# Patient Record
Sex: MALE | Race: ASIAN | NOT HISPANIC OR LATINO | Employment: STUDENT | URBAN - NONMETROPOLITAN AREA
[De-identification: names, ages, dates, MRNs, and addresses within clinical notes are randomized per-mention and may not be internally consistent; named-entity substitution may affect disease eponyms.]

---

## 2020-12-02 ENCOUNTER — OFFICE VISIT (OUTPATIENT)
Dept: FAMILY MEDICINE CLINIC | Facility: CLINIC | Age: 25
End: 2020-12-02

## 2020-12-02 VITALS
DIASTOLIC BLOOD PRESSURE: 72 MMHG | BODY MASS INDEX: 29.49 KG/M2 | HEART RATE: 67 BPM | SYSTOLIC BLOOD PRESSURE: 123 MMHG | TEMPERATURE: 98.4 F | WEIGHT: 177 LBS | HEIGHT: 65 IN | OXYGEN SATURATION: 98 %

## 2020-12-02 DIAGNOSIS — G47.00 INSOMNIA, UNSPECIFIED TYPE: ICD-10-CM

## 2020-12-02 DIAGNOSIS — J20.9 ACUTE BRONCHITIS, UNSPECIFIED ORGANISM: Primary | ICD-10-CM

## 2020-12-02 PROCEDURE — 99203 OFFICE O/P NEW LOW 30 MIN: CPT | Performed by: FAMILY MEDICINE

## 2020-12-02 RX ORDER — AZITHROMYCIN 250 MG/1
TABLET, FILM COATED ORAL
Qty: 6 TABLET | Refills: 0 | Status: SHIPPED | OUTPATIENT
Start: 2020-12-02 | End: 2020-12-07

## 2020-12-02 RX ORDER — GUAIFENESIN AND DEXTROMETHORPHAN HYDROBROMIDE 600; 30 MG/1; MG/1
2 TABLET, EXTENDED RELEASE ORAL EVERY 12 HOURS SCHEDULED
Qty: 40 TABLET | Refills: 0 | Status: SHIPPED | OUTPATIENT
Start: 2020-12-02 | End: 2020-12-12

## 2020-12-02 RX ORDER — HYDROXYZINE HYDROCHLORIDE 25 MG/1
50 TABLET, FILM COATED ORAL NIGHTLY
Qty: 14 TABLET | Refills: 0 | Status: SHIPPED | OUTPATIENT
Start: 2020-12-02 | End: 2020-12-16

## 2020-12-02 NOTE — PATIENT INSTRUCTIONS
Viêm ph? qu?n c?p tính, ng??i l?n  Acute Bronchitis, Adult    Viêm ph? qu?n c?p tính là tình tr?ng s?ng ??t ng?t ho?c c?p tính các ?ng d?n khí (ph? qu?n) danni ph?i. Viêm ph? qu?n c?p tính làm cho các ?ng này ??y d?ch nh?y, ?i?u này có th? d?n ??n khó th?. B?nh c?ng có th? gây ho ho?c th? khò khè.  ? ng??i l?n, viêm ph? qu?n c?p tính th??ng kh?i danni vòng 2 tu?n. Ho do viêm ph? qu?n có th? kéo dài ??n 3 tu?n. Hút thu?c, d? ?ng và hen alex?n có th? làm cho tình tr?ng này tr?m tr?ng h?n.  Nguyên nhân gì gây ra?  Tình tr?ng này có th? do các m?m b?nh và các ch?t gây kích thích ph?i gây ra, oh g?m:  · Vi rút gây c?m l?nh và cúm. Nguyên nhân th??ng g?p nh?t c?a tình tr?ng này là vi rút gây c?m l?nh thông th??ng.  · Vi khu?n.  · Các ch?t gây kích ?ng ph?i, oh g?m:  ? Khói t? thu?c lá ?i?u và các d?ng thu?c lá khác.  ? B?i và ph?n riley.  ? Khói t? các s?n ph?m hóa ch?t, khí, ho?c nhiên li?u ?ã cháy.  ? Các ch?t khác gây ô yolanda?m không khí danni nhà ho?c ngoài tr?i.  · Ti?p xúc g?n g?i v?i ai ?ó b? viêm ph? qu?n c?p tính.  ?i?u gì làm t?ng nguy c??  Nh?ng y?u t? claribel có th? khi?n cho quý v? d? b? tình tr?ng này h?n:  · H? th?ng b?o v? c? thê?, hay còn g?i là h? mi?n d?ch y?u.  · M?t tình tr?ng ?nh h??ng ??n ph?i và hô h?p, ch?ng h?n nh? hen alex?n.  Có các d?u hi?u ho?c tri?u ch?ng gì?  Nh?ng tri?u ch?ng th??ng g?p c?a tình tr?ng này oh g?m:  · Các v?n ?? ? ph?i và hô h?p, ch?ng h?n nh?:  ? Ho. Ho có th? hayde d?ch nh?y danni, màu vàng ho?c xanh ra kh?i ph?i c?a quý v? (??m).  ? Th? khò khè.  ? Có quá yolanda?u d?ch nh?y danni ph?i (t?c ngh?n danni ng?c).  ? B? th? h?n h?n.  · S?t.  · ?n l?nh.  · Các c?n nh?c và ?au, oh g?m:  ? T?c ng?c và ?au nh?c ch? khác trên c? th?.  ? ?au h?ng.  Ch?n ?oán tình tr?ng này nh? th? nào?  Tình tr?ng này th??ng ???c ch?n ?oán d?a vào:  · Tri?u ch?ng và b?nh s? c?a quý v?.  · Khám th?c th?.  Quý v? c?ng có th? ???c làm các ki?m tra khác, oh g?m c? các xét татьяна?m ?? lo?i tr? nh?boo atkins tr?boo cee,  nh? viêm ph?i. Nh??ng ki?m tra này oh g?m:  · Ki?m tra ch?c n?ng ph?i.  · Xét татьяна?m m?t m?u d?ch nh?y ?? tìm ki?m s? hi?n di?n c?a vi khu?n.  · Ki?m tra ?? xem n?ng ?? ô xi danni máu.  · Xét татьяна?m máu.  · Ch?p x-amilcar ng?c.  Tình tr?ng này ???c ?i?u tr? nh? th? nào?  H?u h?t các tr??ng h?p viêm ph? qu?n c?p tính ??u kh?i daylin th?i ke mà không c?n ?i?u tr?. Chuyên yin ch?m sóc s?c kh?e c?a quý v? có th? khuy?n ngh?:  · U?ng yolanda?u n??c h?n. Vi?c này có th? làm loãng d?ch nh?y, có th? c?i thi?n hô h?p.  · U?ng thu?c gi?m s?t ho?c gi?m ho.  · S? d?ng m?t d?ng c? ??a thu?c vào ph?i c?a quý v? (?ng thu?c hít) ?? giúp c?i thi?n hô h?p và kh? n?ng ki?m soát ho.  · Dùng m?t bình phun h?i ho?c máy t?o ?? ?m. Nh?ng máy này b? sung n??c vào không khí ?? giúp quý v? d? th? h?n.  Tuân th? nh?ng h??ng d?n này ? nhà:  Ho?t ??ng  · Ngh? ng?i th?t yolanda?u.  · Tr? l?i sinh ho?t bình th??ng daylin ch? d?n c?a chuyên yin ch?m sóc s?c kh?e. Hãy h?i chuyên yin ch?m sóc s?c kh?e v? các ho?t ??ng nào là an toàn cho quý v?.  L?i s?ng  · U?ng ?? n??c ?? gi? cho n??c ti?u có màu vàng nh?t.  · Không u?ng r??u.  · Không s? d?ng b?t k? s?n ph?m nào có nicotine ho?c thu?c lá, ch?ng ha?n nh? thu?c lá d?ng hút, thu?c lá ?i?n t? và thu?c lá d?ng nhai. N?u quý v? c?n giúp ?? ?? aarti thu?c, hãy h?i chuyên yin ch?m sóc s?c kh?e. L?u ý r?ng:  ? B?nh viêm ph? qu?n c?a quý v? s? tr?m tr?ng h?n n?u quý v? hút thu?c ho?c hít khói thu?c c?a ng??i khác (khói thu?c th? ??ng).  ? Ph?i quý v? s? bình ph?c nhanh h?n n?u quý v? b? hút thu?c.  H??ng d?n underwood    · Ch? s? d?ng thu?c không kê ??n và thu?c kê ??n daylin ch? d?n c?a chuyên yin ch?m sóc s?c kh?e.  · S? d?ng ?ng hít, bình phun h?i n??c mát ho?c máy ?i?u ?m daylin ch? d?n c?a chuyên yin ch?m sóc s?c kh?e.  · N?u quý v? b? ?au h?ng, hãy súc mi?ng b?ng h?n h?p n??c mu?i 3-4 l?n m?i ngày ho?c khi c?n. ?? làm m?t h?n h?p n??c mu?i, hãy hòa tan hoàn toàn ½-1 thìa cà phê (3-6 g) mu?josie razo 1 c?c (237 mL) n??c  ?m.  · Tuân th? t?t c? các l?n khám daylin dõi daylin ch? d?n c?a chuyên yin ch?m sóc s?c kh?e. ?i?u này có vai trò carmelo tr?ng.  Ng?n ng?a tình tr?ng này nh? th? nào?  ?? gi?m nguy c? m?c l?i tình tr?ng này:  · R?a ewelina th???ng xuyên b?ng xà phòng và n??c. N?u không có xà phòng và n??c, hãy dùng ch?t sát trùng ewelina.  · Tránh ti?p xúc v?i nh?ng ng??i có tri?u ch?ng c?m l?nh.  · C? g?ng không dùng ewelina ch?m ewelina lên mi?ng, m?i, ho?c m?t.  · Tránh nh?ng n?i có khói t? các hóa ch?t. Hít khói này s? khi?n tình tr?ng c?a quý v? tr?m tr?ng h?n.  · Tiêm phòng cúm hàng n?m.  Hãy liên l?c v?i chuyên yin ch?m sóc s?c kh?e n?u:  · Các tri?u ch?ng c?a quý v? không gi?m b?t clairbel 2 tu?n ?i?u tr?.  · Quý v? nôn yolanda?u h?n m?t ho?c sumaya l?n.  · Quý v? có các tri?u ch?ng m?t n??c, ch?ng h?n nh?:  ? N??c ti?u ??m màu.  ? Da ho?c m?t khô.  ? Khát t?ng lên.  ? ?au ??u.  ? Lú l?n.  ? Co th?t c?.  Yêu c?u tr? giúp ngay l?p t?c n?u quý v?:  · Ho ra máu.  · C?m th?y ?au ? ng?c.  · B? khó th? r?t yolanda?u.  · Ng?t x?u ho?c quý v? c?m th?y nh? là s?p ng?t x?u.  · B? ?au ??u d? d?i.  · B? s?t ho?c ?n l?nh tr?m tr?ng h?n.  Nh?ng tri?u ch?ng này có th? là bi?u hi?n c?a m?t v?n ?? nghiêm tr?ng c?n c?p c?u. Không ch? xem tri?u ch?ng có h?t không. Hãy ?i khám ngay l?p t?c. G?i cho d?ch v? c?p c?u t?i ??a ph??ng (911 ? Radha K?). Không t? lái xe ??n b?nh vi?n.  Tóm t?t  · Viêm ph? qu?n c?p tính là tình tr?ng viêm ??t ng?t (c?p tính) ? các ?ng d?n khí (ph? qu?n) n?m gi?a khí qu?n và ph?i. ? ng??i l?n, viêm ph? qu?n c?p tính th??ng kh?i danni vòng 2 tu?n, m?c dù ho có th? kéo dài 3 tu?n ho?c lâu h?n  · Ch? s? d?ng thu?c không kê ??n và thu?c kê ??n daylin ch? d?n c?a chuyên yin ch?m sóc s?c kh?e.  · U?ng ?? n??c ?? gi? cho n??c ti?u có màu vàng nh?t.  · Liên h? v?i chuyên yin ch?m sóc s?c kh?e n?u các tri?u ch?ng c?a quý v? không c?i thi?n claribel 2 tu?n ?i?u tr?.  · Tìm s? tr? triny byrd l?p t?c n?u quý v? b? ho ra duke, ng?t x?u, ?au ng?c ho?c th? h?n h?n.  Thông tin này  alissa nh?m m?c ?ích thay th? cho l?i khuyên chad esqueda ch?m sóc s?c kh?e nói v?i quý v?. Hãy b?o ??m quý v? ph?i th?o christiano?n b?t k? v?n ?? gì chad quý v? có v?i benita esqueda ch?m sóc s?c kh?e c?a quý v?.  Document Revised: 08/31/2020 Document Reviewed: 08/31/2020  Elsevier Patient Education © 2020 [employer]    Insomnia  Insomnia is a sleep disorder that makes it difficult to fall asleep or stay asleep. Insomnia can cause fatigue, low energy, difficulty concentrating, mood swings, and poor performance at work or school.  There are three different ways to classify insomnia:  · Difficulty falling asleep.  · Difficulty staying asleep.  · Waking up too early in the morning.  Any type of insomnia can be long-term (chronic) or short-term (acute). Both are common. Short-term insomnia usually lasts for three months or less. Chronic insomnia occurs at least three times a week for longer than three months.  What are the causes?  Insomnia may be caused by another condition, situation, or substance, such as:  · Anxiety.  · Certain medicines.  · Gastroesophageal reflux disease (GERD) or other gastrointestinal conditions.  · Asthma or other breathing conditions.  · Restless legs syndrome, sleep apnea, or other sleep disorders.  · Chronic pain.  · Menopause.  · Stroke.  · Abuse of alcohol, tobacco, or illegal drugs.  · Mental health conditions, such as depression.  · Caffeine.  · Neurological disorders, such as Alzheimer's disease.  · An overactive thyroid (hyperthyroidism).  Sometimes, the cause of insomnia may not be known.  What increases the risk?  Risk factors for insomnia include:  · Gender. Women are affected more often than men.  · Age. Insomnia is more common as you get older.  · Stress.  · Lack of exercise.  · Irregular work schedule or working night shifts.  · Traveling between different time zones.  · Certain medical and mental health conditions.  What are the signs or symptoms?  If you have insomnia, the main symptom  is having trouble falling asleep or having trouble staying asleep. This may lead to other symptoms, such as:  · Feeling fatigued or having low energy.  · Feeling nervous about going to sleep.  · Not feeling rested in the morning.  · Having trouble concentrating.  · Feeling irritable, anxious, or depressed.  How is this diagnosed?  This condition may be diagnosed based on:  · Your symptoms and medical history. Your health care provider may ask about:  ? Your sleep habits.  ? Any medical conditions you have.  ? Your mental health.  · A physical exam.  How is this treated?  Treatment for insomnia depends on the cause. Treatment may focus on treating an underlying condition that is causing insomnia. Treatment may also include:  · Medicines to help you sleep.  · Counseling or therapy.  · Lifestyle adjustments to help you sleep better.  Follow these instructions at home:  Eating and drinking    · Limit or avoid alcohol, caffeinated beverages, and cigarettes, especially close to bedtime. These can disrupt your sleep.  · Do not eat a large meal or eat spicy foods right before bedtime. This can lead to digestive discomfort that can make it hard for you to sleep.  Sleep habits    · Keep a sleep diary to help you and your health care provider figure out what could be causing your insomnia. Write down:  ? When you sleep.  ? When you wake up during the night.  ? How well you sleep.  ? How rested you feel the next day.  ? Any side effects of medicines you are taking.  ? What you eat and drink.  · Make your bedroom a dark, comfortable place where it is easy to fall asleep.  ? Put up shades or blackout curtains to block light from outside.  ? Use a white noise machine to block noise.  ? Keep the temperature cool.  · Limit screen use before bedtime. This includes:  ? Watching TV.  ? Using your smartphone, tablet, or computer.  · Stick to a routine that includes going to bed and waking up at the same times every day and night. This  can help you fall asleep faster. Consider making a quiet activity, such as reading, part of your nighttime routine.  · Try to avoid taking naps during the day so that you sleep better at night.  · Get out of bed if you are still awake after 15 minutes of trying to sleep. Keep the lights down, but try reading or doing a quiet activity. When you feel sleepy, go back to bed.  General instructions  · Take over-the-counter and prescription medicines only as told by your health care provider.  · Exercise regularly, as told by your health care provider. Avoid exercise starting several hours before bedtime.  · Use relaxation techniques to manage stress. Ask your health care provider to suggest some techniques that may work well for you. These may include:  ? Breathing exercises.  ? Routines to release muscle tension.  ? Visualizing peaceful scenes.  · Make sure that you drive carefully. Avoid driving if you feel very sleepy.  · Keep all follow-up visits as told by your health care provider. This is important.  Contact a health care provider if:  · You are tired throughout the day.  · You have trouble in your daily routine due to sleepiness.  · You continue to have sleep problems, or your sleep problems get worse.  Get help right away if:  · You have serious thoughts about hurting yourself or someone else.  If you ever feel like you may hurt yourself or others, or have thoughts about taking your own life, get help right away. You can go to your nearest emergency department or call:  · Your local emergency services (911 in the [place]  · A suicide crisis helpline, such as the National Suicide Prevention Lifeline at 1-724.720.4649. This is open 24 hours a day.  Summary  · Insomnia is a sleep disorder that makes it difficult to fall asleep or stay asleep.  · Insomnia can be long-term (chronic) or short-term (acute).  · Treatment for insomnia depends on the cause. Treatment may focus on treating an underlying condition that is  causing insomnia.  · Keep a sleep diary to help you and your health care provider figure out what could be causing your insomnia.  This information is not intended to replace advice given to you by your health care provider. Make sure you discuss any questions you have with your health care provider.  Document Revised: 11/30/2018 Document Reviewed: 09/27/2018  Elsevier Patient Education © 2020 [employer] Inc.

## 2020-12-02 NOTE — PROGRESS NOTES
Subjective   Ila Mariano is a 25 y.o. male.     The patient is here because of cough for 10 days. No fever or chills. Patient also complaining of a hard time going to sleep at night. The patient has a PCP on his medical card but he has never seen him. He also wants to get established here as a primary care patient.    Cough  This is a new problem. The current episode started 1 to 4 weeks ago. The problem has been unchanged. The problem occurs every few minutes. The cough is productive of sputum. Pertinent negatives include no chest pain, chills, ear congestion, ear pain, fever, headaches, nasal congestion, postnasal drip, rash, rhinorrhea, sore throat or shortness of breath. He has tried OTC cough suppressant for the symptoms.   Insomnia  This is a new problem. The current episode started 1 to 4 weeks ago. The problem occurs daily. The problem has been unchanged. Associated symptoms include coughing. Pertinent negatives include no abdominal pain, chest pain, chills, congestion, fatigue, fever, headaches, nausea, neck pain, rash, sore throat, vomiting or weakness. Nothing aggravates the symptoms. He has tried nothing for the symptoms.        The following portions of the patient's history were reviewed and updated as appropriate: allergies, current medications, past family history, past medical history, past social history, past surgical history, and problem list.    Review of Systems   Constitutional: Negative for chills, fatigue and fever.   HENT: Negative for congestion, ear pain, hearing loss, postnasal drip, rhinorrhea and sore throat.    Eyes: Negative for blurred vision and pain.   Respiratory: Positive for cough. Negative for shortness of breath.    Cardiovascular: Negative for chest pain.   Gastrointestinal: Negative for abdominal pain, diarrhea, nausea and vomiting.   Endocrine: Negative for polydipsia and polyuria.   Genitourinary: Negative for flank pain, frequency and urgency.   Musculoskeletal:  "Negative for back pain and neck pain.   Skin: Negative for rash.   Neurological: Negative for dizziness, weakness and headache.   Hematological: Does not bruise/bleed easily.   Psychiatric/Behavioral: Negative for depressed mood. The patient has insomnia. The patient is not nervous/anxious.        /72   Pulse 67   Temp 98.4 °F (36.9 °C)   Ht 165.1 cm (65\")   Wt 80.3 kg (177 lb)   SpO2 98%   BMI 29.45 kg/m²    Body mass index is 29.45 kg/m².     Objective   Physical Exam  Vitals signs and nursing note reviewed.   Constitutional:       General: He is not in acute distress.     Appearance: Normal appearance. He is well-developed and well-groomed.   HENT:      Head: Normocephalic and atraumatic.      Jaw: No tenderness or pain on movement.      Salivary Glands: Right salivary gland is not diffusely enlarged. Left salivary gland is not diffusely enlarged.      Right Ear: Tympanic membrane and ear canal normal.      Left Ear: Tympanic membrane and ear canal normal.      Nose: No congestion or rhinorrhea.      Right Sinus: No maxillary sinus tenderness or frontal sinus tenderness.      Left Sinus: No maxillary sinus tenderness or frontal sinus tenderness.      Mouth/Throat:      Mouth: Mucous membranes are moist.      Pharynx: No oropharyngeal exudate or posterior oropharyngeal erythema.   Eyes:      General: Lids are normal. No allergic shiner or scleral icterus.     Conjunctiva/sclera: Conjunctivae normal.      Pupils: Pupils are equal, round, and reactive to light.   Neck:      Musculoskeletal: Neck supple.      Thyroid: No thyroid mass, thyromegaly or thyroid tenderness.      Trachea: Trachea normal.   Cardiovascular:      Rate and Rhythm: Normal rate and regular rhythm.      Pulses: Normal pulses.      Heart sounds: Normal heart sounds.   Pulmonary:      Effort: Pulmonary effort is normal. No respiratory distress.      Breath sounds: Rales (bilateral, coarse) present. No wheezing.   Chest:      Breasts:    "      Right: Normal.         Left: Normal.   Abdominal:      General: Abdomen is flat.      Palpations: Abdomen is soft.   Skin:     General: Skin is warm.      Nails: There is no clubbing.     Neurological:      General: No focal deficit present.      Mental Status: He is alert and oriented to person, place, and time.   Psychiatric:         Mood and Affect: Mood normal.         Behavior: Behavior normal.           Assessment/Plan   Problems Addressed this Visit     None      Visit Diagnoses     Acute bronchitis, unspecified organism    -  Primary    Relevant Medications    guaifenesin-dextromethorphan (MUCINEX DM)  MG tablet sustained-release 12 hour tablet    Insomnia, unspecified type          Diagnoses       Codes Comments    Acute bronchitis, unspecified organism    -  Primary ICD-10-CM: J20.9  ICD-9-CM: 466.0     Insomnia, unspecified type     ICD-10-CM: G47.00  ICD-9-CM: 780.52         Rest. Increase oral fluids.  The patient will return and sign up to be a primary care patient.       Return if symptoms worsen or fail to improve.         This document has been electronically signed by Gutierrez Nunez MD   December 8, 2020 14:57 EST    Part of this note may be an electronic transcription/translation of spoken language to printed text using the Dragon Dictation System.